# Patient Record
(demographics unavailable — no encounter records)

---

## 2025-03-18 NOTE — HISTORY OF PRESENT ILLNESS
[FreeTextEntry5] : He receives two hours of special education instruction daily, along with three weekly speech therapy sessions and one weekly occupational therapy session, all requiring parental transportation.  He will be attending [facility]. [FreeTextEntry1] : Luis is a 3-year-3-month old boy with Autism, Global Developmental Delay and mixed language disorder for follow up. Luis is demonstrating positive developmental progress, producing a wider variety of sounds and exhibiting increased eye contact, although he doesn't consistently respond to his name.   He has a  that comes home for 2 hours daily Monday to friday.  His parents express concern that his current teacher's approach lacks structure, focusing primarily on free play with toys.  While Luis makes a persistent noise, it ceases when he watches cartoons.  Generally, a happy and easily soothed child, he is now following commands (e.g., giving objects on request, placing his bottle on the table) and responding to high fives.  Parents recently watched the [facility] special regarding leucovorin treatment for children non-verbal with ASD and would like to know if its something they can explore.  [Major Illness] : no major illness [Major Injury] : no major injury [Surgery] : no surgery [Hospitalizations] : no hospitalizations [New Medications] : no new medication [New Allergies] : no new allergies

## 2025-03-18 NOTE — REASON FOR VISIT
[Follow-Up Visit] : a follow-up visit [FreeTextEntry2] : autism/developmental follow up  [FreeTextEntry3] : July 2024

## 2025-03-18 NOTE — HISTORY OF PRESENT ILLNESS
[FreeTextEntry5] : He receives two hours of special education instruction daily, along with three weekly speech therapy sessions and one weekly occupational therapy session, all requiring parental transportation.  He will be attending [facility] [employer]. [FreeTextEntry1] : Luis is a 3-year-3-month old boy with Autism, Global Developmental Delay and mixed language disorder for follow up. Luis is demonstrating positive developmental progress, producing a wider variety of sounds and exhibiting increased eye contact, although he doesn't consistently respond to his name.   He has a  that comes home for 2 hours daily Monday to friday.  His parents express concern that his current teacher's approach lacks structure, focusing primarily on free play with toys.  While Luis makes a persistent noise, it ceases when he watches cartoons.  Generally, a happy and easily soothed child, he is now following commands (e.g., giving objects on request, placing his bottle on the table) and responding to high fives.  Parents recently watched the [facility] special regarding leucovorin treatment for children non-verbal with ASD and would like to know if its something they can explore.  [Major Illness] : no major illness [Major Injury] : no major injury [Surgery] : no surgery [Hospitalizations] : no hospitalizations [New Medications] : no new medication [New Allergies] : no new allergies

## 2025-03-18 NOTE — PLAN
[FreeTextEntry3] : I advised Luis's parents that he would likely benefit from Applied Behavior Analysis (SHANNAN) therapy.  I suggested they consider replacing the current two hours of special education instruction with SHANNAN therapy, given their concerns about the lack of structure in his current educational setting. This would allow Luis to receive a more intensive, individualized intervention focused on developing specific skills and addressing challenging behaviors.  I explained that SHANNAN therapy utilizes evidence-based strategies to teach communication, social skills, and adaptive behaviors, and can be tailored to meet Luis's unique needs.  I discussed the potential use of leucovorin in treating children with autism spectrum disorder (ASD) who also possess a specific antibody, FRAA. I emphasized that while some studies suggest mild improvements in some children with this antibody, the current evidence is not definitive.  Therefore, antibody testing for FRAA is strongly recommended before considering leucovorin therapy to determine if a child is likely to benefit.  Furthermore, I thoroughly explained the potential side effects associated with leucovorin, ensuring the parents had a clear understanding of the risks and benefits before making any decisions about this treatment approach.  Parents expressed they prefer to treat without doing testing.  Follow up in 6-weeks  [Family Questions] : Family's questions were addressed

## 2025-03-18 NOTE — PLAN
[FreeTextEntry3] : I advised Luis's parents that he would likely benefit from Applied Behavior Analysis (SAHNNAN) therapy.  I suggested they consider replacing the current two hours of special education instruction with SHANNAN therapy, given their concerns about the lack of structure in his current educational setting. This would allow Luis to receive a more intensive, individualized intervention focused on developing specific skills and addressing challenging behaviors.  I explained that SHANNAN therapy utilizes evidence-based strategies to teach communication, social skills, and adaptive behaviors, and can be tailored to meet Luis's unique needs.  I discussed the potential use of leucovorin in treating children with autism spectrum disorder (ASD) who also possess a specific antibody, FRAA. I emphasized that while some studies suggest mild improvements in some children with this antibody, the current evidence is not definitive.  Therefore, antibody testing for FRAA is strongly recommended before considering leucovorin therapy to determine if a child is likely to benefit.  Furthermore, I thoroughly explained the potential side effects associated with leucovorin, ensuring the parents had a clear understanding of the risks and benefits before making any decisions about this treatment approach.  Parents expressed they prefer to treat without doing testing.  Follow up in 6-weeks  [Family Questions] : Family's questions were addressed

## 2025-04-28 NOTE — REASON FOR VISIT
[Father] : father [Home] : at home, [unfilled] , at the time of the visit. [Other Location: e.g. Home (Enter Location, City,State)___] : at [unfilled] [Telehealth (audio & video)] : This visit was provided via telehealth using real-time 2-way audio visual technology. [Follow-Up Visit] : a follow-up visit [FreeTextEntry3] : father [FreeTextEntry2] : medication follow up [FreeTextEntry4] : leucovorin 37.5 mg (1.5 tablet of 25 mg) [FreeTextEntry1] : father

## 2025-04-28 NOTE — HISTORY OF PRESENT ILLNESS
[FreeTextEntry5] : He receives two hours of special education instruction daily, along with three weekly speech therapy sessions and one weekly occupational therapy session, all requiring parental transportation.  He will be attending [facility] [employer]. [FreeTextEntry1] : Luis is a 3-year-4-month-old boy with Autism, Global Developmental Delay and mixed language disorder for follow up. Luis started taking leucovorin last month.  Parents are not sure if it's because of the medication, but he is communicating more non-verbally. He is communicating in different ways. He hasn't had any side effects at all.   [Major Illness] : no major illness [Major Injury] : no major injury [Surgery] : no surgery [Hospitalizations] : no hospitalizations [New Medications] : no new medication [New Allergies] : no new allergies

## 2025-04-28 NOTE — HISTORY OF PRESENT ILLNESS
[FreeTextEntry5] : He receives two hours of special education instruction daily, along with three weekly speech therapy sessions and one weekly occupational therapy session, all requiring parental transportation.  He will be attending [facility]. [FreeTextEntry1] : Luis is a 3-year-4-month-old boy with Autism, Global Developmental Delay and mixed language disorder for follow up. Luis started taking leucovorin last month.  Parents are not sure if it's because of the medication, but he is communicating more non-verbally. He is communicating in different ways. He hasn't had any side effects at all.   [Major Illness] : no major illness [Major Injury] : no major injury [Surgery] : no surgery [Hospitalizations] : no hospitalizations [New Medications] : no new medication [New Allergies] : no new allergies

## 2025-04-28 NOTE — PLAN
[FreeTextEntry3] : continue with same dose follow up at the end of July virtual [Family Questions] : Family's questions were addressed